# Patient Record
Sex: MALE | Race: WHITE | Employment: FULL TIME | URBAN - METROPOLITAN AREA
[De-identification: names, ages, dates, MRNs, and addresses within clinical notes are randomized per-mention and may not be internally consistent; named-entity substitution may affect disease eponyms.]

---

## 2019-05-06 ENCOUNTER — TELEPHONE (OUTPATIENT)
Dept: FAMILY MEDICINE CLINIC | Age: 34
End: 2019-05-06

## 2019-05-06 NOTE — TELEPHONE ENCOUNTER
----- Message from Nora Arvizu sent at 5/6/2019  3:38 PM EDT -----  Regarding: Dr. Deann Thomas Pt's mother, Maximus Alcazar, would like to schedule a new patient appointment, unable to warm transfer. Best contact number [phone].

## 2019-05-08 ENCOUNTER — TELEPHONE (OUTPATIENT)
Dept: FAMILY MEDICINE CLINIC | Age: 34
End: 2019-05-08

## 2019-05-08 NOTE — TELEPHONE ENCOUNTER
Message received today from Louise Martinez who states she is patient's wife and two identifiers given to identify patient requesting a new patient appointment. Message forward to Dr. Johnson Covert nurse for review. Patient's wife aware of message status.

## 2019-05-08 NOTE — TELEPHONE ENCOUNTER
Krystina Kennedy is current pt and trying to schedule his space   States he has talked about it at his apptmnts with Dr. Matilde Sepulveda     [phone]

## 2019-05-08 NOTE — TELEPHONE ENCOUNTER
Per  at [address] message should be routed to Dr. Елена Ramirez nurse to review and address. Patient's wife Luz Maria Rand aware. Per Luz Maria Rand she has spoken with Dr. Елена Ramirez about taking on her  as new patient.   Luz Maria Rand is a current patient of Dr. Елена Ramirez.

## 2019-05-28 ENCOUNTER — OFFICE VISIT (OUTPATIENT)
Dept: FAMILY MEDICINE CLINIC | Age: 34
End: 2019-05-28

## 2019-05-28 VITALS
OXYGEN SATURATION: 96 % | WEIGHT: 187 LBS | TEMPERATURE: 98.1 F | SYSTOLIC BLOOD PRESSURE: 136 MMHG | DIASTOLIC BLOOD PRESSURE: 92 MMHG | BODY MASS INDEX: 26.77 KG/M2 | HEIGHT: 70 IN | HEART RATE: 64 BPM

## 2019-05-28 DIAGNOSIS — Z11.3 ROUTINE SCREENING FOR STI (SEXUALLY TRANSMITTED INFECTION): Primary | ICD-10-CM

## 2019-05-28 RX ORDER — FLUTICASONE PROPIONATE 50 MCG
2 SPRAY, SUSPENSION (ML) NASAL DAILY
COMMUNITY

## 2019-05-30 LAB
ALBUMIN SERPL-MCNC: 4.6 G/DL (ref 3.5–5.5)
ALBUMIN/GLOB SERPL: 1.5 {RATIO} (ref 1.2–2.2)
ALP SERPL-CCNC: 68 IU/L (ref 39–117)
ALT SERPL-CCNC: 42 IU/L (ref 0–44)
AST SERPL-CCNC: 38 IU/L (ref 0–40)
BASOPHILS # BLD AUTO: 0 X10E3/UL (ref 0–0.2)
BASOPHILS NFR BLD AUTO: 0 %
BILIRUB SERPL-MCNC: 1.2 MG/DL (ref 0–1.2)
BUN SERPL-MCNC: 12 MG/DL (ref 6–20)
BUN/CREAT SERPL: 13 (ref 9–20)
CALCIUM SERPL-MCNC: 9.5 MG/DL (ref 8.7–10.2)
CHLORIDE SERPL-SCNC: 99 MMOL/L (ref 96–106)
CO2 SERPL-SCNC: 24 MMOL/L (ref 20–29)
CREAT SERPL-MCNC: 0.9 MG/DL (ref 0.76–1.27)
EOSINOPHIL # BLD AUTO: 0 X10E3/UL (ref 0–0.4)
EOSINOPHIL NFR BLD AUTO: 1 %
ERYTHROCYTE [DISTWIDTH] IN BLOOD BY AUTOMATED COUNT: 13.1 % (ref 12.3–15.4)
GLOBULIN SER CALC-MCNC: 3 G/DL (ref 1.5–4.5)
GLUCOSE SERPL-MCNC: 90 MG/DL (ref 65–99)
HAV AB SER QL IA: POSITIVE
HAV IGM SERPL QL IA: NEGATIVE
HBV CORE AB SERPL QL IA: NEGATIVE
HBV SURFACE AB SER QL: REACTIVE
HBV SURFACE AG SERPL QL IA: NEGATIVE
HCT VFR BLD AUTO: 43.6 % (ref 37.5–51)
HCV AB S/CO SERPL IA: <0.1 S/CO RATIO (ref 0–0.9)
HGB BLD-MCNC: 14.7 G/DL (ref 13–17.7)
HIV 1+2 AB+HIV1 P24 AG SERPL QL IA: NON REACTIVE
HIV1 RNA # SERPL NAA+PROBE: <20 COPIES/ML
HIV1 RNA SERPL NAA+PROBE-LOG#: NORMAL LOG10COPY/ML
IMM GRANULOCYTES # BLD AUTO: 0 X10E3/UL (ref 0–0.1)
IMM GRANULOCYTES NFR BLD AUTO: 0 %
LYMPHOCYTES # BLD AUTO: 1.5 X10E3/UL (ref 0.7–3.1)
LYMPHOCYTES NFR BLD AUTO: 23 %
MCH RBC QN AUTO: 32 PG (ref 26.6–33)
MCHC RBC AUTO-ENTMCNC: 33.7 G/DL (ref 31.5–35.7)
MCV RBC AUTO: 95 FL (ref 79–97)
MONOCYTES # BLD AUTO: 0.6 X10E3/UL (ref 0.1–0.9)
MONOCYTES NFR BLD AUTO: 9 %
NEUTROPHILS # BLD AUTO: 4.3 X10E3/UL (ref 1.4–7)
NEUTROPHILS NFR BLD AUTO: 67 %
PLATELET # BLD AUTO: 231 X10E3/UL (ref 150–450)
POTASSIUM SERPL-SCNC: 4.5 MMOL/L (ref 3.5–5.2)
PROT SERPL-MCNC: 7.6 G/DL (ref 6–8.5)
RBC # BLD AUTO: 4.59 X10E6/UL (ref 4.14–5.8)
RPR SER QL: NON REACTIVE
SODIUM SERPL-SCNC: 139 MMOL/L (ref 134–144)
WBC # BLD AUTO: 6.4 X10E3/UL (ref 3.4–10.8)

## 2019-06-03 ENCOUNTER — TELEPHONE (OUTPATIENT)
Dept: FAMILY MEDICINE CLINIC | Age: 34
End: 2019-06-03

## 2019-06-03 NOTE — TELEPHONE ENCOUNTER
----- Message from Lukas Parsons sent at 5/31/2019  4:27 PM EDT -----  Regarding: Dr. Tamera Pizarro / Telephone  Pt would like his lab results from his visit on Tuesday, May 28, 2019    Best contact: [phone]

## 2019-06-12 NOTE — TELEPHONE ENCOUNTER
Spoke with patient. Patient states he will get results when he comes in for his apmt on 6/13/18. Patient denies any further questions or concerns at this time.

## 2019-06-13 ENCOUNTER — OFFICE VISIT (OUTPATIENT)
Dept: FAMILY MEDICINE CLINIC | Age: 34
End: 2019-06-13

## 2019-06-13 VITALS
OXYGEN SATURATION: 100 % | TEMPERATURE: 98 F | HEART RATE: 96 BPM | HEIGHT: 70 IN | RESPIRATION RATE: 16 BRPM | DIASTOLIC BLOOD PRESSURE: 82 MMHG | SYSTOLIC BLOOD PRESSURE: 122 MMHG | WEIGHT: 186 LBS | BODY MASS INDEX: 26.63 KG/M2

## 2019-06-13 DIAGNOSIS — Z11.3 ROUTINE SCREENING FOR STI (SEXUALLY TRANSMITTED INFECTION): Primary | ICD-10-CM

## 2019-06-13 RX ORDER — EMTRICITABINE AND TENOFOVIR DISOPROXIL FUMARATE 200; 300 MG/1; MG/1
1 TABLET, FILM COATED ORAL DAILY
Qty: 30 TAB | Refills: 3 | Status: SHIPPED | OUTPATIENT
Start: 2019-06-13 | End: 2020-11-06

## 2019-06-13 NOTE — PROGRESS NOTES
Infectious Disease Consult Note          HPI:    Mr Manjula Root is here for PrEP  Says he has been well  No symptoms today  Same partner, uses condoms but not consistently  Denied any other high risk sexual behavior           Medications:  Current Outpatient Medications on File Prior to Visit   Medication Sig Dispense Refill    fluticasone propionate (FLONASE ALLERGY RELIEF) 50 mcg/actuation nasal spray 2 Sprays by Both Nostrils route daily.  ipratropium (ATROVENT) 0.03 % nasal spray 2 Sprays by Both Nostrils route every twelve (12) hours. 30 mL 5     No current facility-administered medications on file prior to visit. ROS 10 point per HPI, all others negative     Physical Exam:    · Vitals: reviewed   · GEN: NAD  · HEENT: Normocephalic, atraumatic, PERRL, no scleral icterus,  no cervical, no lymphadenopathy, no sinus tenderness, no thrush  · CV: S1, S2 heard regularly, no murmurs, thrills or rubs heard   · Lungs: Clear to auscultation bilaterally  · Abdomen: soft, non distended, non tender, no organomegaly appreciated, no CVA tenderness   · Genitourinary: no genital discharge, no conway  · Extremities: no edema  · Neuro: Alert, oriented to self,  place and situation, moves all extremities to commands, verbal   · Skin: no rash  · Psych: good affect, good eye contact, non tearful          Labs from 5/28/19    Component Value Flag Ref Range Units Status   Hepatitis A Ab, IgM Negative   Negative  Final   Hep A Ab, total Positive  Abnormal            HEP B SURFACE AB, QUAL   Reactive    Comment:                 Non Reactive:  Inconsistent with immunity,                               less than 10 mIU/mL                 Reactive:     Consistent with immunity,                               greater than 9.9 mIU/mL        Hep B surface Ag screen Negative   Negative  Final       Hep B Core Ab, total Negative   Negative  Final     Hep C Virus Ab <0.1   0.0 - 0.9 s/co ratio Final   Comment:                                     Negative:     < 0.8                                Indeterminate: 0.8 - 0.9                                     Positive:     > 0.9      HIV SCREEN 4TH GENERATION WRFX Non Reactive   Non Reactive  Final       HIV-1 RNA by PCR <20    copies/mL Final   Comment:   HIV-1 RNA not detected   The reportable range for this assay is 20 to 10,000,000   copies HIV-1 RNA/mL. HIV-1 RNA log10 Copies/mL CANCELED    [id number]/mL        RPR Non Reactive   Non Reactive  Final           5/28/19 Cr 0.90             Assessment / Plan:     Mr Kirsty Pedroza is a 36year old gentleman with no significant medical/surgical hx here to discuss about PrEP. His  is a patient of mine with HIV and on treatment and well controlled. 1) PrEP: discussed about PrEP, the commitment needed for labs every 3 months , Truvada medication, side effects, and long term effects on kidney/bone and compliance needed  HIV 4th gen screening negative, HIV VL < 20  Cannot use Truvada alone with HIV and rational for periodic testing to ensure no conversion   D/W about consistent condom use  ??  If gonorrhea, chlamydia not done as ordered last visit, will re order     2) Screening: Hep A and B serologies indicate immunity  RPR negative   Hep C ab negative     3) F/U 3 months   Check blood work and for refills

## 2019-06-15 LAB
C TRACH RRNA SPEC QL NAA+PROBE: NEGATIVE
N GONORRHOEA RRNA SPEC QL NAA+PROBE: NEGATIVE

## 2020-11-06 ENCOUNTER — OFFICE VISIT (OUTPATIENT)
Dept: INTERNAL MEDICINE CLINIC | Age: 35
End: 2020-11-06
Payer: COMMERCIAL

## 2020-11-06 VITALS
TEMPERATURE: 98.3 F | BODY MASS INDEX: 26.92 KG/M2 | RESPIRATION RATE: 16 BRPM | HEIGHT: 70 IN | SYSTOLIC BLOOD PRESSURE: 130 MMHG | OXYGEN SATURATION: 96 % | HEART RATE: 74 BPM | WEIGHT: 188 LBS | DIASTOLIC BLOOD PRESSURE: 80 MMHG

## 2020-11-06 DIAGNOSIS — Z11.3 ROUTINE SCREENING FOR STI (SEXUALLY TRANSMITTED INFECTION): Primary | ICD-10-CM

## 2020-11-06 DIAGNOSIS — Z20.6 HIV EXPOSURE: ICD-10-CM

## 2020-11-06 PROCEDURE — 99212 OFFICE O/P EST SF 10 MIN: CPT | Performed by: INTERNAL MEDICINE

## 2020-11-06 NOTE — PROGRESS NOTES
Infectious Disease Clinic Note          HPI:    Mr Servando Carter is here for PrEP discussion and labs   He saw me last in 5/2019 and says Debora Villela was very expensive and did not fill it   Would like labs done again  Sexually active without condom use wt one male partner with HIV but well controlled   Denies any symptoms today           Medications:  Current Outpatient Medications on File Prior to Visit   Medication Sig Dispense Refill    emtricitabine-tenofovir, TDF, (TRUVADA) 200-300 mg per tablet Take 1 Tab by mouth daily. 30 Tab 3    fluticasone propionate (FLONASE ALLERGY RELIEF) 50 mcg/actuation nasal spray 2 Sprays by Both Nostrils route daily.  ipratropium (ATROVENT) 0.03 % nasal spray 2 Sprays by Both Nostrils route every twelve (12) hours. 30 mL 5     No current facility-administered medications on file prior to visit. ROS 10 point per HPI, all others negative     Physical Exam:    · Vitals: reviewed   · GEN: NAD  · HEENT:  no scleral icterus,  no cervical, no lymphadenopathy, no sinus tenderness, no thrush  · CV: S1, S2 heard regularly, no murmurs, thrills or rubs heard   · Lungs: Clear to auscultation bilaterally  · Abdomen: soft, non distended, non tender, no organomegaly appreciated, no CVA tenderness   · Extremities: no edema  · Neuro: Alert, oriented to self,  place and situation, moves all extremities to commands, verbal   · Skin: no rash  · Psych: good affect, good eye contact, non tearful        Labs    6/13/19  Component  Value  Flag  Ref Range  Units  Status    Chlamydia trachomatis, LUCIANA  Negative   Negative   Final    Neisseria gonorrhoeae, LUCIANA  Negative                Labs from 5/28/19    Component Value Flag Ref Range Units Status   Hepatitis A Ab, IgM Negative   Negative  Final   Hep A Ab, total Positive  Abnormal            HEP B SURFACE AB, QUAL   Reactive    Comment:                 Non Reactive:  Inconsistent with immunity,                               less than 10 mIU/mL                 Reactive:     Consistent with immunity,                               greater than 9.9 mIU/mL        Hep B surface Ag screen Negative   Negative  Final       Hep B Core Ab, total Negative   Negative  Final     Hep C Virus Ab <0.1   0.0 - 0.9 s/co ratio Final   Comment:                                     Negative:     < 0.8                                Indeterminate: 0.8 - 0.9                                     Positive:     > 0.9      HIV SCREEN 4TH GENERATION WRFX Non Reactive   Non Reactive  Final       HIV-1 RNA by PCR <20    copies/mL Final   Comment:   HIV-1 RNA not detected   The reportable range for this assay is 20 to 10,000,000   copies HIV-1 RNA/mL. HIV-1 RNA log10 Copies/mL CANCELED    [id number]/mL        RPR Non Reactive   Non Reactive  Final           5/28/19 Cr 0.90             Assessment / Plan:     Mr Michelle Singh is a 36year old gentleman with no significant medical/surgical hx here to discuss about PrEP. His  is a patient of mine with HIV and on treatment and well controlled.       1) PrEP: discussed about PrEP, the commitment needed for labs every 3 months , Truvada verus descovy medication, side effects with each  long term effects on kidney/bonehigher wt TDF regimen compared to TAF  TAF however noted to have higher incidence of mild weight gain and lipid issues   Will get repeat labs for HIV, STI screen, Hepatitis   Patient wants to think about this and will revisit once we have labs   D/W that complaince needed with labs and follow ups q 3 months if plans to do PrEP and he expressed understanding     2) Immunizations:   Hep A and B serologies indicate immunity  Recommend influenza vaccine if he has not obtained yet     3) F/U 3 months

## 2020-11-06 NOTE — PROGRESS NOTES
Mindi Ingram is a 30 y.o. male    Chief Complaint   Patient presents with    Follow-up     1. Have you been to the ER, urgent care clinic since your last visit? Hospitalized since your last visit? No      2. Have you seen or consulted any other health care providers outside of the [address] [place] [address] since your last visit? Include any pap smears or colon screening.  Yes [facility] for sinus infection

## 2020-11-07 LAB
ALBUMIN SERPL-MCNC: 4.7 G/DL (ref 4–5)
ALBUMIN/GLOB SERPL: 1.7 {RATIO} (ref 1.2–2.2)
ALP SERPL-CCNC: 72 IU/L (ref 39–117)
ALT SERPL-CCNC: 122 IU/L (ref 0–44)
AST SERPL-CCNC: 73 IU/L (ref 0–40)
BASOPHILS # BLD AUTO: 0.1 X10E3/UL (ref 0–0.2)
BASOPHILS NFR BLD AUTO: 1 %
BILIRUB SERPL-MCNC: 0.3 MG/DL (ref 0–1.2)
BUN SERPL-MCNC: 10 MG/DL (ref 6–20)
BUN/CREAT SERPL: 12 (ref 9–20)
CALCIUM SERPL-MCNC: 9.6 MG/DL (ref 8.7–10.2)
CHLORIDE SERPL-SCNC: 101 MMOL/L (ref 96–106)
CO2 SERPL-SCNC: 25 MMOL/L (ref 20–29)
CREAT SERPL-MCNC: 0.86 MG/DL (ref 0.76–1.27)
EOSINOPHIL # BLD AUTO: 0.1 X10E3/UL (ref 0–0.4)
EOSINOPHIL NFR BLD AUTO: 1 %
ERYTHROCYTE [DISTWIDTH] IN BLOOD BY AUTOMATED COUNT: 12.4 % (ref 11.6–15.4)
GLOBULIN SER CALC-MCNC: 2.8 G/DL (ref 1.5–4.5)
GLUCOSE SERPL-MCNC: 93 MG/DL (ref 65–99)
HAV AB SER QL IA: POSITIVE
HAV IGM SERPL QL IA: NEGATIVE
HBV CORE AB SERPL QL IA: NEGATIVE
HBV SURFACE AB SER QL: REACTIVE
HBV SURFACE AG SERPL QL IA: NEGATIVE
HCT VFR BLD AUTO: 44.5 % (ref 37.5–51)
HCV AB S/CO SERPL IA: <0.1 S/CO RATIO (ref 0–0.9)
HGB BLD-MCNC: 15.5 G/DL (ref 13–17.7)
IMM GRANULOCYTES # BLD AUTO: 0 X10E3/UL (ref 0–0.1)
IMM GRANULOCYTES NFR BLD AUTO: 0 %
LYMPHOCYTES # BLD AUTO: 1.8 X10E3/UL (ref 0.7–3.1)
LYMPHOCYTES NFR BLD AUTO: 31 %
MCH RBC QN AUTO: 32.1 PG (ref 26.6–33)
MCHC RBC AUTO-ENTMCNC: 34.8 G/DL (ref 31.5–35.7)
MCV RBC AUTO: 92 FL (ref 79–97)
MONOCYTES # BLD AUTO: 0.6 X10E3/UL (ref 0.1–0.9)
MONOCYTES NFR BLD AUTO: 10 %
NEUTROPHILS # BLD AUTO: 3.2 X10E3/UL (ref 1.4–7)
NEUTROPHILS NFR BLD AUTO: 57 %
PLATELET # BLD AUTO: 295 X10E3/UL (ref 150–450)
POTASSIUM SERPL-SCNC: 4.8 MMOL/L (ref 3.5–5.2)
PROT SERPL-MCNC: 7.5 G/DL (ref 6–8.5)
RBC # BLD AUTO: 4.83 X10E6/UL (ref 4.14–5.8)
RPR SER QL: NON REACTIVE
SODIUM SERPL-SCNC: 140 MMOL/L (ref 134–144)
WBC # BLD AUTO: 5.8 X10E3/UL (ref 3.4–10.8)

## 2020-11-11 ENCOUNTER — TELEPHONE (OUTPATIENT)
Dept: INTERNAL MEDICINE CLINIC | Age: 35
End: 2020-11-11

## 2020-11-11 DIAGNOSIS — Z11.3 ROUTINE SCREENING FOR STI (SEXUALLY TRANSMITTED INFECTION): Primary | ICD-10-CM

## 2020-11-17 NOTE — TELEPHONE ENCOUNTER
Call received from patient and made aware that new labs ordered and need to go have redrawn, patient voiced understanding

## 2020-11-17 NOTE — TELEPHONE ENCOUNTER
Patient wants to know if all his labs were done. He says he is missing some results. please call him back at [phone]

## 2020-12-07 ENCOUNTER — DOCUMENTATION ONLY (OUTPATIENT)
Dept: INFECTIOUS DISEASES | Age: 35
End: 2020-12-07

## 2020-12-07 NOTE — PROGRESS NOTES
HIV labs not available yet    Discussed with patient    He gave us  Permission to mail the HIV labs to his address  That he says is     [address], [address], [place] [address]    Will revist prep once labs results available     Tracee Bunn,   2:01 PM

## 2023-05-21 RX ORDER — IPRATROPIUM BROMIDE 21 UG/1
2 SPRAY, METERED NASAL EVERY 12 HOURS
COMMUNITY
Start: 2016-08-26

## 2023-05-21 RX ORDER — FLUTICASONE PROPIONATE 50 MCG
2 SPRAY, SUSPENSION (ML) NASAL DAILY
COMMUNITY